# Patient Record
(demographics unavailable — no encounter records)

---

## 2025-03-19 NOTE — ASSESSMENT
[FreeTextEntry1] : Assessment: 1. LE edema 2. HTN 3. HLD - last LDL 84 4. DM 5. STEARNS  Plan: 1. STEARNS CTA coronaries ordered to evaluate for CAD. Patient is a former smoker and diabetic. Knee pain prohibits stress testing.  2. LE edema  LE venous negative. Improved with compression stockings.   Compression garments. Leg elevation. Low salt diet. Continue diuretic PRN. 3. BP controlled. 4. HLD Continue Statin. 5. Health Maintenance Healthy diet and exercise. 6. Follow-up in 6 months. Call with any questions. Office will call with test results.   I, Dr. Frank Bonds personally performed the evaluation and management (E/M) services for this new patient.  That E/M includes conducting the initial examination, assessing all conditions, and establishing the plan of care.  Today, my ACP, Tre Conte, was here to observe my evaluation and management services for this patient to be followed going forward.

## 2025-03-19 NOTE — PHYSICAL EXAM
[General Appearance - Well Developed] : well developed [Normal Appearance] : normal appearance [Well Groomed] : well groomed [General Appearance - Well Nourished] : well nourished [No Deformities] : no deformities [General Appearance - In No Acute Distress] : no acute distress [Normal Conjunctiva] : the conjunctiva exhibited no abnormalities [Eyelids - No Xanthelasma] : the eyelids demonstrated no xanthelasmas [Normal Oral Mucosa] : normal oral mucosa [No Oral Pallor] : no oral pallor [No Oral Cyanosis] : no oral cyanosis [Normal Jugular Venous A Waves Present] : normal jugular venous A waves present [Normal Jugular Venous V Waves Present] : normal jugular venous V waves present [No Jugular Venous Davis A Waves] : no jugular venous davis A waves [Respiration, Rhythm And Depth] : normal respiratory rhythm and effort [Exaggerated Use Of Accessory Muscles For Inspiration] : no accessory muscle use [Auscultation Breath Sounds / Voice Sounds] : lungs were clear to auscultation bilaterally [Heart Rate And Rhythm] : heart rate and rhythm were normal [Heart Sounds] : normal S1 and S2 [Murmurs] : no murmurs present [Abdomen Soft] : soft [Abdomen Tenderness] : non-tender [Abdomen Mass (___ Cm)] : no abdominal mass palpated [Abnormal Walk] : normal gait [] : no ischemic changes [No Skin Ulcers] : no skin ulcer [Oriented To Time, Place, And Person] : oriented to person, place, and time [FreeTextEntry1] : Decreased LE edema, Bilateral calves soft

## 2025-03-19 NOTE — REASON FOR VISIT
[Follow-Up - Clinic] : a clinic follow-up of [FreeTextEntry2] : LE edema [FreeTextEntry1] : 3/19/2025  Since last visit our kelsi patient reports being started on Mounjaro. Reports dyspnea on heavy exertion. Notes palpitations.   Medications: Glipizide Metformin Zocor 20 mg daily Lasix PRN  TTE 10/2024 1. Left ventricular cavity is normal in size. Left ventricular wall thickness is normal. Left ventricular systolic function is normal with an ejection fraction of 66 % by Stanford's method of disks. There are no regional wall motion abnormalities seen. 2. Normal left ventricular diastolic function, with normal left ventricular filling pressure. 3. Normal right ventricular cavity size, with normal wall thickness, and normal right ventricular systolic function. 4. Trace tricuspid regurgitation.   9/18/2024  52 y/o Kelsi F with PMHX DM, HLD, HTN, Asthma, who presents for evaluation.  for [place] transit. PCP Dr. Hewitt. Works nightshift Presents for LE edema. Gastric sleeve done in 2016. Reports 65 lbs weight loss.  Currently not exercising. Recent LE venous duplex negative.   Medications: Glipizide Metformin  Zocor 20 mg daily Lasix 20 mg twice weekly

## 2025-06-19 NOTE — ASSESSMENT
[FreeTextEntry1] : 06/05/2025: [facility] LT shoulder x-rays from 05/14 reveals no fx/abnormalities. Underlying pathology reviewed and treatment options discussed. Obtain MRI LT shoulder R/O RTC tears.  Treatment options including medications such as NSAIDs discussed. Activity modification as tolerated. Questions addressed. Follow up after MRI.  06/19/2025; MRI reviewed and discussed.  Based on my independent interpretation of the MRI images there's marrow edema measuring 2.8 cm in the anterior and central greater tuberosity, full thickness RTC tear, effusion, synovitis, AC joint arthrosis,  Underlying pathology reviewed and treatment options discussed. Start PT and HEP to improve mechanics and reduce pain. We discussed risk and benefits of CSI. Patient elected to proceed with steroid injection today - tolerated well. Ice if sore. If symptoms persist after conservative care, surgery will be discussed.  Questions answered.   The documentation recorded by the scribe accurately reflects the service I personally performed and the decisions made by me. I, Yadira Daniel, attest that this documentation has been prepared under the direction and in the presence of Provider Ross Dc MD.   The patient was seen by Ross Dc MD.  Cimetidine Pregnancy And Lactation Text: This medication is Pregnancy Category B and is considered safe during pregnancy. It is also excreted in breast milk and breast feeding isn't recommended.

## 2025-06-19 NOTE — HISTORY OF PRESENT ILLNESS
[de-identified] : 06/19/2025: Here for follow up of the left shoulder. She had MRI performed. Recent A1C was 6.1  06/05/2025: This is a 52 year old female, c/o left shoulder pain for months with no injury. She went to [facility] for the pain, got x-rays done which were normal. Denies history. Denies trauma. There is previous NSAIDs use, tried ice/heat therapy. There are night symptoms. There is no n/t. Reaching can be painful.  Occupation: BUS [employer]

## 2025-06-19 NOTE — DATA REVIEWED
[MRI] : MRI [Left] : left [Shoulder] : shoulder [Report was reviewed and noted in the chart] : The report was reviewed and noted in the chart [I independently reviewed and interpreted images and report] : I independently reviewed and interpreted images and report [FreeTextEntry1] : MRI L shoulder (O&C 6/11/25): Nonspecific reactive marrow edema measuring 2.8 cm in the anterior and central greater tuberosity with a small subcortical cyst and bursitis, potentially representing stress reaction associated with high-grade tearing which may be full-thickness at the supraspinatus tendon insertion and high-grade partial tearing of the biceps tendon which appears partially dislocated and split medially with severe proximal tenosynovitis and partial tearing of the superior subscapularis tendon insertion. There is no well-defined bone lesion or surrounding soft tissue mass appreciated. Labral tearing, effusion and synovitis with capsulitis anteriorly and AC joint arthrosis with infraspinatus tendinopathy and lateral acromial bone spurs. Correlation with plain films and follow up with plain films is recommended to ensure no underlying osseous pathology in the anterior and central greater tuberosity. No musle atrophy.

## 2025-06-19 NOTE — DISCUSSION/SUMMARY
[de-identified] : The patient was advised of the diagnosis. The natural history of the pathology was explained in full to the patient in layman's terms. The risks and benefits of surgical and non-surgical treatment alternatives were explained in full to the patient. All questions were answered.

## 2025-06-19 NOTE — PROCEDURE
[FreeTextEntry3] : A Large joint injection was performed of the [LEFT SUBACROMIAL SPACE]. The indication for this procedure was pain and inflammation, and patient had decreased mobility in the joint. Risks, benefits and alternatives to a steroid injection procedure were discussed; Risks outlined include but are not limited to infection, sepsis, bleeding, scarring, skin discoloration, temporary increase in pain, syncopal episode, failure to resolve symptoms, allergic reaction, symptom recurrence, and elevation of blood sugar in diabetics.. All questions were answered to the patient's apparent satisfaction and informed consent obtained. Prior to injection a 'Time Out' was conducted in accordance with [facility] policy and the site and nature of procedure verified with the patient.    Procedure: The area of injection was prepared in a sterile fashion. The injection and aspiration was carried out utilizing sterile technique. The site was prepped with alcohol, betadine, ethyl chloride sprayed topically and sterile technique used.   (X) 1cc of Celestone(30mg/ml)  (X) 2cc of 1% Lidocaine   Patient tolerated the procedure well and direct pressure was applied for hemostasis. The patient was reminded of potential post-injection risks including, but not limited to, delayed hypersensitivity reactions and/or infection. Ice tonight to the injection site.

## 2025-06-19 NOTE — PHYSICAL EXAM
[Left] : left shoulder [NL (0-70)] : full internal rotation 0-70 degrees [Sitting] : sitting [Mild] : mild [] : no sensory deficits [TWNoteComboBox7] : active forward flexion 120 degrees [TWNoteComboBox4] : passive forward flexion 160 degrees